# Patient Record
Sex: FEMALE | URBAN - METROPOLITAN AREA
[De-identification: names, ages, dates, MRNs, and addresses within clinical notes are randomized per-mention and may not be internally consistent; named-entity substitution may affect disease eponyms.]

---

## 2020-01-15 NOTE — PROGRESS NOTES
Sergio Mccarthy MD [facility] [place]  [id number]  [place], [address] [facility]    Office [phone]  Fax [phone]  Cell [phone]       Nurys Loco is a 36 y.o. female referred for evaluation of syncope. Consult requested by Dr Amanda Mccormack      Diagnoses  Encounter Diagnoses     ICD-10-CM ICD-9-CM   1. Syncope, unspecified syncope type R55 780.2       Assessment/Recommendations:    Long hx of recurrent syncope, likely d/t orthostatic hypotension, excessive salt loss coupled with strenuous exertion (, ). She consumes at least 5g of salt daily. Her exam and EKG today are normal. She is not orthostatic and does not have POTS. Resting bradycardia is d/t her fitness level. She does not have Addision's disease.   - Repeat echo to ensure she has no LV pathology. She may have athlete heart from strength training.  - Trial of Florinef 0.1mg/d. check BMP w/ Dr. Paz  in 2-3 weeks. - Continue aggressive hydration. Phone follow up after reviewing tests     F/u in 3 months. Follow-up and Dispositions    · Return in about 3 months (around 4/16/2020). Subjective:    Cardiac hx significant for recurrent syncope as an early teenager. Linsey Underwood reports she had chronic syncope as a child (10-11yo) after playing sports. She had a tilt table test which was abnormal. EKG at that time was apparently normal. She experiences nausea and stabbing kidney pain prior to syncope. She is extremely active -  at [employer], also . Patient denies any exertional chest pain, dyspnea, palpitations, orthopnea, edema or paroxysmal nocturnal dyspnea. She saw Dr. Paz  from endocrinology. Extensive testing did not show evidence of adrenal insufficiency. She may have PCOS. Note and labs reviewed in detail. Normal CMP, TSH. Free testosterone elevated. Cortisol and 17OHP nl. She has an extremely healthy diet.  She drinks about a gallon of water daily. Her urine is never clear. She eats between 5000-8000mg of salt daily. She drinks caffeine and alcohol at times. Her periods are slightly irregular, not going more than 2 months. Cardiac risk factors   HTN no  DM  no  Smoking no  Family hx of CAD no      Cardiac testing  No specialty comments available. No past medical history on file. Social History     Patient does not qualify to have social determinant information on file (likely too young). Socioeconomic History    Marital status: UNKNOWN     Spouse name: Not on file    Number of children: Not on file    Years of education: Not on file    Highest education level: Not on file           Not on File       Review of Symptoms:  Constitutional: Negative for fever, chills, malaise/fatigue and diaphoresis. Respiratory: Negative for cough, hemoptysis, sputum production, and wheezing. Cardiovascular: Negative for chest pain, palpitations, orthopnea, claudication, leg swelling and PND. Gastrointestinal: Negative for heartburn, nausea, vomiting, blood in stool and melena. Genitourinary: Negative for dysuria and flank pain. Musculoskeletal: Negative for joint pain and back pain. Skin: Negative for rash. Neurological: Negative for focal weakness, seizures, weakness and headaches. +syncope  Endo/Heme/Allergies: Does not bruise/bleed easily. Psychiatric/Behavioral: Negative for memory loss. The patient does not have insomnia. Physical Exam  Visit Vitals  /80 (BP 1 Location: Left arm, BP Patient Position: Sitting)   Pulse 69   Ht 5' 8\" (1.727 m)   Wt 182 lb (82.6 kg)   SpO2 98%   BMI 27.67 kg/m²     Wt Readings from Last 3 Encounters:   01/16/20 182 lb (82.6 kg)     General - WDWN  HEENT: Eyes without jaundice, Hearing intact  Neck - JVP normal, thyroid nl  Cardiac - normal S1,S2, no murmurs, rubs or gallops.  No clicks  Vascular - carotids without bruits, radials, femorals and pedal pulses equal bilateral  Lungs - clear to auscultation bilaterals, no rales ,wheezing or rhonchi  Abd - soft nontender, no HSM, no abd bruits  Extremities - no edema  Neuro - nonfocal, normal gait  Psych - mood and affect normal    Labs:      Cardiographics  EKG 1/16/20 - SR 56, normal        Written by Edgardo Mehta, as dictated by Jenaro Ross M.D.      Jenaro Ross MD

## 2020-01-16 ENCOUNTER — OFFICE VISIT (OUTPATIENT)
Dept: CARDIOLOGY CLINIC | Age: 35
End: 2020-01-16

## 2020-01-16 VITALS
SYSTOLIC BLOOD PRESSURE: 128 MMHG | WEIGHT: 182 LBS | DIASTOLIC BLOOD PRESSURE: 80 MMHG | BODY MASS INDEX: 27.58 KG/M2 | HEART RATE: 69 BPM | OXYGEN SATURATION: 98 % | HEIGHT: 68 IN

## 2020-01-16 DIAGNOSIS — R55 SYNCOPE, UNSPECIFIED SYNCOPE TYPE: Primary | ICD-10-CM

## 2020-01-16 RX ORDER — FLUDROCORTISONE ACETATE 0.1 MG/1
0.1 TABLET ORAL DAILY
Qty: 30 TAB | Refills: 3 | Status: SHIPPED | OUTPATIENT
Start: 2020-01-16 | End: 2020-01-27 | Stop reason: SDUPTHER

## 2020-01-16 NOTE — PROGRESS NOTES
Extended / Orthostatic Vitals:  Patient Position 2: Supine  BP 2: 102/80  Pulse 2: 56  Patient Position 3: Standing  BP 3: 110/70  Pulse 3: 54

## 2020-01-16 NOTE — PROGRESS NOTES
Linsey Ocampo is a 36 y.o. female    Chief Complaint   Patient presents with    Dizziness       Chest pain no  SOB no  Dizziness no  Swelling no  Recent hospital visit no  Refills no  Visit Vitals  /80 (BP 1 Location: Left arm, BP Patient Position: Sitting)   Pulse 69   Ht 5' 8\" (1.727 m)   Wt 182 lb (82.6 kg)   SpO2 98%   BMI 27.67 kg/m²

## 2020-01-27 ENCOUNTER — TELEPHONE (OUTPATIENT)
Dept: CARDIOLOGY CLINIC | Age: 35
End: 2020-01-27

## 2020-01-27 RX ORDER — FLUDROCORTISONE ACETATE 0.1 MG/1
0.1 TABLET ORAL DAILY
Qty: 30 TAB | Refills: 3 | Status: SHIPPED | OUTPATIENT
Start: 2020-01-27

## 2020-01-27 NOTE — TELEPHONE ENCOUNTER
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 No AR/AS. 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 Favor ALE evaluation. The procedure was discussed at length with the patient, including risks/benefits, potential findings and treatment options. .She agrees to proceed. She has not started Florinef as yet - pharmacy issues with availability.      ALE near future  ASA 1  Airway 1

## 2020-01-28 NOTE — TELEPHONE ENCOUNTER
[id number] attempted to schedule ALE with patient. 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 Will have medical insurance for 2 more momths.     Dr.KApadia CARDOZA

## 2020-02-13 ENCOUNTER — TELEPHONE (OUTPATIENT)
Dept: CARDIOLOGY CLINIC | Age: 35
End: 2020-02-13

## 2020-02-13 NOTE — TELEPHONE ENCOUNTER
Patient is returning Dr. Nataly Dotson call from last week. Please advise.     Phone #: [phone]  Thanks